# Patient Record
(demographics unavailable — no encounter records)

---

## 2024-11-13 NOTE — HISTORY OF PRESENT ILLNESS
[TextBox_4] : EX SMOKER CHEST CT 9/22 NOTED 10 MM GGO ( WAS 6 MM 22021) SP REPEAT CHEST CT INCREASING IN SIZE SP PFT SISTER LUNG CA DOING WELL

## 2024-11-13 NOTE — DISCUSSION/SUMMARY
[FreeTextEntry1] :  LUNG NODULE/ INCREASE IN SIZE ADENO IN SITU, REPEAT CHEST CT NOTED PET SCAN REFER FOR CT SX COPD MILD UACS WEIGHT LOSS FUP AFTER CHEST CT

## 2024-11-14 NOTE — BEGINNING OF VISIT
[0] : 2) Feeling down, depressed, or hopeless: Not at all (0) [PHQ-2 Negative] : PHQ-2 Negative [PHQ-9 Negative] : PHQ-9 Negative [Former] : Former [> 15 Years] : > 15 Years [Date Discussed (MM/DD/YY): ___] : Discussed: [unfilled] [With Patient/Caregiver] : with Patient/Caregiver

## 2024-11-18 NOTE — ASSESSMENT
[FreeTextEntry1] : #History of left breast cancer with no evidence of disease .                                                                                                 #low B12 and is on replacement.    #Osteoporosis on vitamin D, Calcium  Bone density 5/15/24 results show osteopenia with T-score 2.2  #COPD . pulmonary nodules , followed by pulmonary Last CT chest 10/29/24 shows 1.8cm right lower lobe subsolid/groundglass nodule, increased in size since 1/3/24 and 8/14/23. Additionally, b/l 4mm and smaller lung nodules, unchanged size, some resolved since 1/3/24.   Plan : repeat right mammogram, last mammo in November, 2023            PET scan scheduled for tomorrow           F/u with Dr. Hernandez           continue taking Vit D and calcium            follow up in one year

## 2024-11-18 NOTE — HISTORY OF PRESENT ILLNESS
[de-identified] : \par  HISTORY OF PRESENT ILLNESS:  The patient returns for remote history of invasive breast cancer diagnosed in 2008 status post left mastectomy, bilateral implant reconstruction, adjuvant hormonal therapy with Arimidex.  Most recent DEXA scan showed worsening of bone mineral density with maximum T-score of -2.6 representing 8% reduction compared to 2013.  Dr. Dwyer recommended not to resume Fosamax, which she took for two years in the past, possibly while on Arimidex.  She is scheduled to see Rheumatology for further evaluation.  She had negative colonoscopy recently.  \par  \par   [de-identified] : The patient returns for routine followup for remote history of breast cancer, she is back on fosamax for osteoporosis. She is caring for her young grandchildren after the passing of her daughter in law to lung cancer. she denies any significant arthralgias, bone pain, cough shortness of breath or headaches.   03/25/2019 : patient returns for history of left breast cancer , She had negative right mammogram in August . She was intolerant to fosomax and is only on vitamin D ( takes calcium once or twice weekly ) . Repeat CT chest showed stable pulmonary nodules and denies any cough or change in breathing .   8/27/2020: patient is here for follow visit for hx of left breast cancer s/p implant reconstruction  09/28/2021 Patient returns for routine follow up visit . she related no new complaints . she follows with pulmonary . she is on vitamin D 10.000 weekly .   9/27/2022 Patient returns for remote history of left breast cancer with no evidence of disease. She feels well , denies any cough , SOB , new breast lumps , bone pain or headaches .   9/26/2023 Patient returns for remote history of left breast cancer . She feels well and follows with pulmonary for groundglass opacities. no cough or change in breathing .   11/14/24: Patient returns today for follow-up of remote hx of left breast cancer. Pt admits to overall feeling well. Pt states she is due for her mammogram this year and is scheduled for a PET scan tomorrow. Last CT chest 10/29/24 shows 1.8cm right lower lobe subsolid/groundglass nodule, increased in size since 1/3/24 and 8/14/23. Additionally b/l 4mm and smaller lung nodules, unchanged size, some resolved since 1/3/24. Pt denies cough, SOB, or difficulty breathing,

## 2024-11-18 NOTE — HISTORY OF PRESENT ILLNESS
[de-identified] : \par  HISTORY OF PRESENT ILLNESS:  The patient returns for remote history of invasive breast cancer diagnosed in 2008 status post left mastectomy, bilateral implant reconstruction, adjuvant hormonal therapy with Arimidex.  Most recent DEXA scan showed worsening of bone mineral density with maximum T-score of -2.6 representing 8% reduction compared to 2013.  Dr. Dwyer recommended not to resume Fosamax, which she took for two years in the past, possibly while on Arimidex.  She is scheduled to see Rheumatology for further evaluation.  She had negative colonoscopy recently.  \par  \par   [de-identified] : The patient returns for routine followup for remote history of breast cancer, she is back on fosamax for osteoporosis. She is caring for her young grandchildren after the passing of her daughter in law to lung cancer. she denies any significant arthralgias, bone pain, cough shortness of breath or headaches.   03/25/2019 : patient returns for history of left breast cancer , She had negative right mammogram in August . She was intolerant to fosomax and is only on vitamin D ( takes calcium once or twice weekly ) . Repeat CT chest showed stable pulmonary nodules and denies any cough or change in breathing .   8/27/2020: patient is here for follow visit for hx of left breast cancer s/p implant reconstruction  09/28/2021 Patient returns for routine follow up visit . she related no new complaints . she follows with pulmonary . she is on vitamin D 10.000 weekly .   9/27/2022 Patient returns for remote history of left breast cancer with no evidence of disease. She feels well , denies any cough , SOB , new breast lumps , bone pain or headaches .   9/26/2023 Patient returns for remote history of left breast cancer . She feels well and follows with pulmonary for groundglass opacities. no cough or change in breathing .   11/14/24: Patient returns today for follow-up of remote hx of left breast cancer. Pt admits to overall feeling well. Pt states she is due for her mammogram this year and is scheduled for a PET scan tomorrow. Last CT chest 10/29/24 shows 1.8cm right lower lobe subsolid/groundglass nodule, increased in size since 1/3/24 and 8/14/23. Additionally b/l 4mm and smaller lung nodules, unchanged size, some resolved since 1/3/24. Pt denies cough, SOB, or difficulty breathing,

## 2024-11-18 NOTE — HISTORY OF PRESENT ILLNESS
[de-identified] : \par  HISTORY OF PRESENT ILLNESS:  The patient returns for remote history of invasive breast cancer diagnosed in 2008 status post left mastectomy, bilateral implant reconstruction, adjuvant hormonal therapy with Arimidex.  Most recent DEXA scan showed worsening of bone mineral density with maximum T-score of -2.6 representing 8% reduction compared to 2013.  Dr. Dwyer recommended not to resume Fosamax, which she took for two years in the past, possibly while on Arimidex.  She is scheduled to see Rheumatology for further evaluation.  She had negative colonoscopy recently.  \par  \par   [de-identified] : The patient returns for routine followup for remote history of breast cancer, she is back on fosamax for osteoporosis. She is caring for her young grandchildren after the passing of her daughter in law to lung cancer. she denies any significant arthralgias, bone pain, cough shortness of breath or headaches.   03/25/2019 : patient returns for history of left breast cancer , She had negative right mammogram in August . She was intolerant to fosomax and is only on vitamin D ( takes calcium once or twice weekly ) . Repeat CT chest showed stable pulmonary nodules and denies any cough or change in breathing .   8/27/2020: patient is here for follow visit for hx of left breast cancer s/p implant reconstruction  09/28/2021 Patient returns for routine follow up visit . she related no new complaints . she follows with pulmonary . she is on vitamin D 10.000 weekly .   9/27/2022 Patient returns for remote history of left breast cancer with no evidence of disease. She feels well , denies any cough , SOB , new breast lumps , bone pain or headaches .   9/26/2023 Patient returns for remote history of left breast cancer . She feels well and follows with pulmonary for groundglass opacities. no cough or change in breathing .   11/14/24: Patient returns today for follow-up of remote hx of left breast cancer. Pt admits to overall feeling well. Pt states she is due for her mammogram this year and is scheduled for a PET scan tomorrow. Last CT chest 10/29/24 shows 1.8cm right lower lobe subsolid/groundglass nodule, increased in size since 1/3/24 and 8/14/23. Additionally b/l 4mm and smaller lung nodules, unchanged size, some resolved since 1/3/24. Pt denies cough, SOB, or difficulty breathing,

## 2024-11-19 NOTE — HISTORY OF PRESENT ILLNESS
[FreeTextEntry1] :  Ms. JODY QUINONEZ is a 79 year F, former smoker, quit 35 years ago, that arrives today for a consultation for a 1.8 cm RLL GGO nodule, with interval growth since 1/3/2024 and 8/14/2023. Patient was seen by their Pulmonologist for routine follow up  CT Scan done on 10/29, followed by a PET/CT (Both @ ). PMHX: COPD, osteoporosis, Hiatal hernia, Left Breast CA , s/p left mastectomy with B/L implant [place] in 2008 ([place]) , (adjuvant chemo /hormonal therapy with Arimidex) x 5 years. Here today for surgical discussion.    Their Healthcare team is as follows: PMD: Dr. Levin Cardiologist: None Pulmonologist: Dr. Alisia Santos: Dr. Morin GYN: Yuliya    ECOG 0 , Independent, Lives with son and grandchildren Former smoker- 30 pack years COVID History- Vaccinated Denies major psychiatric history  Family History of Lung CA- Sister NSC

## 2024-11-19 NOTE — HISTORY OF PRESENT ILLNESS
[FreeTextEntry1] :  Ms. JODY QUINONEZ is a 79 year F, former smoker, quit 35 years ago, that arrives today for a consultation for a 1.8 cm RLL GGO nodule, with interval growth since 1/3/2024 and 8/14/2023. Patient was seen by their Pulmonologist for routine follow up  CT Scan done on 10/29, followed by a PET/CT (Both @ ). PMHX: COPD, osteoporosis, Hiatal hernia, Left Breast CA , s/p left mastectomy with B/L implant reconstruction in 2008 ([place]) , (adjuvant chemo /hormonal therapy with Arimidex) x 5 years. Here today for surgical discussion.    Their Healthcare team is as follows: PMD: Dr. Levin Cardiologist: None Pulmonologist: Dr. Alisia Santos: Dr. Morin GYN: Yuilya    ECOG 0 , Independent, Lives with son and grandchildren Former smoker- 30 pack years COVID History- Vaccinated Denies major psychiatric history  Family History of Lung CA- Sister NSC Consent: Written consent obtained. Risks include but not limited to lid/brow ptosis, bruising, swelling, diplopia, temporary effect, incomplete chemical denervation.

## 2024-11-19 NOTE — DATA REVIEWED
[FreeTextEntry1] : Full Report IMPRESSION:   1.  The enlarging groundglass nodule in the right lower lobe is mildly FDG avid. Continue close CT chest surveillance..   2.  Status post left mastectomy and implant reconstruction, symmetrizing right breast implant. Intracapsular rupture of the left implant..   3.  There is no PET/CT evidence of locoregional recurrence..     Diagnostic confidence level used in this report:   Consistent with/compatible with or no modifier - greater than 98% Most likely - greater than 90% Likely/probably - greater than 75% Possibly 50% Less likely - less than 25% Unlikely - less than 5%     AGENT:   13.8 mCi F18-FDG, IV via the right antecubital vein.   HISTORY:   79-year-old female with history of left breast carcinoma, status post mastectomy. In 2008. Enlarging lung nodules .   EXAM CATEGORY:   Initial treatment strategy.   TECHNIQUE:   90 minutes following injection of the radiopharmaceutical, PET/CT imaging was performed from the skull base to the mid thigh.  No oral and IV contrast was administered. Fasting serum glucose was 82 mg/dL prior to injection.     All SUV values reported represent maximum SUV (SUVmax) unless otherwise specified.  This study was interpreted using a lean body mass corrected SUV technique.  Please note this may result in lower SUV values compared to a body-weight corrected technique. .   CT protocol was designed for attenuation correction of PET and used for anatomic localization of PET findings. Not optimized for anatomical resolution and contrast. This low-dose  CT cannot replace state-of-the-art diagnostic CT scans with dedicated protocols for different body parts and indications.   Pulmonary nodule slice numbers refer to the lung kernel reconstruction unless specified.     COMPARISON:   CT chest 10/29/2024   FINDINGS:   Head and neck:   There is no suspicious FDG uptake in the head and neck.   No head and neck lymphadenopathy.   Chest:   The enlarging groundglass nodule in the right lower lobe is mildly FDG avid, series 3 image 69 SUV 0.5. Continue CT chest surveillance in 6 month.   The other subcentimeter solid nodules in the lungs are non-FDG avid, some are less conspicuous on CT due to respiratory blur.   Status post left mastectomy and implant reconstruction, symmetrizing right breast implant. There is intracapsular rupture of the left implant (screening mediastinum. There is capsular calcification of the right breast implant.   There is no hilar, mediastinal, supraclavicular, or axillary lymphadenopathy. No pleural or pericardial effusions. The heart size is normal. There is moderate calcification of the coronary arteries and of the thoracic aorta. There is a small hiatal hernia..   Abdomen and pelvis:   There is no suspicious FDG uptake in the abdomen/pelvis.   On CT, the solid organs of the abdomen and pelvis are unremarkable.  No lymphadenopathy.  No acute bowel-related abnormality. There is colonic diverticulosis.   Musculoskeletal and extremities:   There are no suspicious FDG-avid osseous lesions. There is FDG activity of the periarticular soft tissues (shoulders and hips) reflecting inflammation secondary to degenerative changes.   No aggressive osseous lesions are seen on CT.   Electronic Signature: I personally reviewed the images and agree with this report. Final Report: Dictated by  and Signed by Attending Evy Hernandez MD 11/18/2024 3:01 PM External Result Report  CT Chest Full Report IMPRESSION:      IMPORTANT FINDING. THIS REPORT WILL BE FLAGGED (!) IN EPIC.   1.8 CM RIGHT LOWER LOBE SUBSOLID OR GROUNDGLASS NODULE, INCREASED SIZE SINCE 1/3/2024 AND 8/14/2023, POSSIBLY ALONG SPECTRUM OF ADENOCARCINOMA. FURTHER EVALUATION WITH PET CT AND/OR CLOSE INTERVAL FOLLOW-UP CT CHEST IN 3-6 MONTHS RECOMMENDED.   Bilateral 4 mm and smaller lung nodules, unchanged size, some resolved since 1/3/2024.         CT CHEST WITHOUT IV CONTRAST   CLINICAL INDICATION: Lung nodules. Recurring cough   COMPARISON:   CT chest studies, most recent 1/3/2024   TECHNIQUE: Noncontrast chest CT was performed.  Multiplanar, (axial, sagittal and coronal)  CT and HRCT images were reviewed.   FINDINGS:    LUNGS, AIRWAYS: Trachea and central bronchi patent. No bronchiectasis. Mild diffuse bronchial wall thickening.   No emphysema, interstitial lung disease or pulmonary fibrosis. Mild right upper lobe parenchymal scarring. No focal consolidation.   1.8 cm right lower lobe posterior subsolid groundglass nodule, increased size. Series 4 image 189. Bilateral 5 mm and smaller solid nodules, unchanged size, some resolved. Annotated on series 4.   PLEURA: No pleural effusion   MEDIASTINUM, TRACIE, LYMPH NODES: No enlarged axillary or thoracic lymph nodes   HEART AND VESSELS: Heart normal size, no pericardial effusion. Mild coronary artery calcifications. Normal caliber aorta with mild calcifications. Main pulmonary artery is normal size.   UPPER ABDOMEN: No adrenal nodule   BONES AND SOFT TISSUES: Bilateral breast prostheses, left greater than right with possible rupture, unchanged. Mild degenerative changes of the spine with mild osteopenia, mild dextroscoliosis, mild to moderate kyphosis. No aggressive osseous lesion.   LOWER NECK: Small, partially imaged unremarkable thyroid gland.   Electronic Signature: I personally reviewed the images and agree with this report. Final Report: Dictated by  and Signed by Attending Dorian Gutierrez MD 10/29/2024 1:53 PM External Result Report

## 2024-11-19 NOTE — PHYSICAL EXAM
[General Appearance - Alert] : alert [General Appearance - In No Acute Distress] : in no acute distress [Sclera] : the sclera and conjunctiva were normal [PERRL With Normal Accommodation] : pupils were equal in size, round, and reactive to light [Extraocular Movements] : extraocular movements were intact [Outer Ear] : the ears and nose were normal in appearance [Oropharynx] : the oropharynx was normal [Neck Appearance] : the appearance of the neck was normal [Neck Cervical Mass (___cm)] : no neck mass was observed [Jugular Venous Distention Increased] : there was no jugular-venous distention [Thyroid Diffuse Enlargement] : the thyroid was not enlarged [Thyroid Nodule] : there were no palpable thyroid nodules [Auscultation Breath Sounds / Voice Sounds] : lungs were clear to auscultation bilaterally [Heart Rate And Rhythm] : heart rate was normal and rhythm regular [Heart Sounds] : normal S1 and S2 [Heart Sounds Gallop] : no gallops [Murmurs] : no murmurs [Heart Sounds Pericardial Friction Rub] : no pericardial rub [Examination Of The Chest] : the chest was normal in appearance [Chest Visual Inspection Thoracic Asymmetry] : no chest asymmetry [Diminished Respiratory Excursion] : normal chest expansion [Abnormal Walk] : normal gait [Nail Clubbing] : no clubbing  or cyanosis of the fingernails [Musculoskeletal - Swelling] : no joint swelling seen [Motor Tone] : muscle strength and tone were normal [Skin Color & Pigmentation] : normal skin color and pigmentation [Skin Turgor] : normal skin turgor [Deep Tendon Reflexes (DTR)] : deep tendon reflexes were 2+ and symmetric [Sensation] : the sensory exam was normal to light touch and pinprick [No Focal Deficits] : no focal deficits [Oriented To Time, Place, And Person] : oriented to person, place, and time [Impaired Insight] : insight and judgment were intact [Affect] : the affect was normal [Bowel Sounds] : normal bowel sounds [Abdomen Soft] : soft [Abdomen Tenderness] : non-tender [] : no hepato-splenomegaly [Abdomen Mass (___ Cm)] : no abdominal mass palpated [No CVA Tenderness] : no ~M costovertebral angle tenderness [No Spinal Tenderness] : no spinal tenderness

## 2024-11-19 NOTE — ASSESSMENT
[FreeTextEntry1] : CT Chest & PET /CT images reviewed and discussed with patient, revealing, enlarging groundglass nodule in the right lower lobe is mildly FDG avid and other subcentimeter solid nodules in the lungs (Non-FDG avid) No adenopathy noted. Of note, Left intracapsular rupture of implant.  Alternatives discussed including surveillance vs biopsy vs Resection. All associated risks and benefits reviewed as well. Patient is currently uncertain if she wishes to survey or proceed with surgery at this time.  -Plan Requested patient to bring in disc from [facility] to upload F/U Breast Surgeon referral to Dr. Lc watt at office visit F/U [facility]  upon patient decision If Surveillance decided, will need CT Chest in 4 months If Resection decided will need PFTs / Cardiac clearance (referral for Cardiologist needed as well)

## 2025-01-10 NOTE — DATA REVIEWED
[FreeTextEntry1] : Last mammogram, right breast 8/29/18)-no mammographic evidence of malignancy BIRADS 1\par  \par  CT chest /abd/pelvis 9/14/18)-no evidence of metastatic disease no inflammatory process. bilateral breast prostheses present.  Left mastectomy. Prosthesis asymmetric in size with partial collapse of the right noted.  No axillary adenopathy.\par  \par

## 2025-01-10 NOTE — ASSESSMENT
[FreeTextEntry1] : 78 yo F with h/o of left breast cancer s/p silicone implant reconstruction and right breast silicone implant augmentation for symmetry.    Right breast with asymptomatic capsular contracture and double bubble deformity.   Left breast with asymptomatic intracapsular implant rupture   -Recommend patient proceed with right lung nodule r/o malignancy with Dr. Mitchell before considering elective non-urgent breast reconstruction revision. -Briefly discussed the option of right breast revision for symmetry-capsulectomy, implant exchange, and mastopexy and concurrent left breast removal of ruptured implant and replacement with silicone implant -All questions were answered -Daughter present for encounter -Follow up in 3 months after CT Surgery and recovery

## 2025-01-10 NOTE — REASON FOR VISIT
[Follow-Up: _____] : a [unfilled] follow-up visit [Friend] : friend [FreeTextEntry1] : DR Morin-[facility] [place]; Dr. Dwyer-Gyn

## 2025-01-10 NOTE — PHYSICAL EXAM
[Bra Size: _______] : Bra Size: [unfilled] [de-identified] : well-appearing, NAD [de-identified] :  Right breast: no palpable breast masses, well-healed periareolar scar, nipple discharge or retraction, breast ptosis grade 2 with double bubble deformity; nontender visible and palpable capsular contracture grade 3/4, implant animation mild with pectoralis contraction, nontender chest wall Left breast: no palpable breast masses, well-healed transverse mastectomy scar with mobile submuscular implant with moderate animation deformity, grade 1 capsular contracture, nontender chest wall. Bilateral breast with no palpable breast masses Bilateral ptotic axillary roll, left > right Bilateral axilla without palpable lymphadenopathy

## 2025-01-10 NOTE — HISTORY OF PRESENT ILLNESS
[FreeTextEntry1] : 74 yo F with PMHx of GERD on PPI s/p with history of <1 cm left breast cancer 10/2008, s/p mastectomy 2008 ([facility]-Breast surgeon), and delayed TE of the left breast 2009, followed by left TE to silicone implant exchange and concurrent right breast augmentation for symmetry ([facility]).  No history of chemo or XRT therapy.  Currently in a 38B bra with pads  Referred by Dr. Morin for CT scan report of "Prosthesis asymmetric in size with partial collapse of the right noted."  CT scans ordered for work up of chest pain and h/o GERD.  The patient did not bring CD of CT scans for review. Reports have breast surgery related pain in both breast since date of mastectomy has continued after reconstruction.  Endorses that the pain is both constant and tolerable; no increase or change in quality of pain.  She is not interested in revision surgery as she is the primary caretaker of her young grandkids; she is only concerned if their is an implant leak.  She denies chest trauma, breast redness, nipple discharge, breast nodules, breast swelling. Pt provided silicone implant card; Left breast 666 cc and right breast 120 cc.  Last mammogram, right breast 18)-negative  She is raising her grandson (3 yr) and granddaughter (7 yrs) because daughter in law ([place] lung cancer,  of metastatic disease).  She is the primary caretaker of her grandkids.  Accompanied by her friend.  Interval hx (1/10/25):  Last seen in [facility] office 18 who is now here for evaluation of ruptured left implant as seen on PET scan.  Patient states she has a newly diagnosed RLL nodule which she is undergoing biopsy for at the end of January by Dr. Ernst Baker.  During further work up for nodule she underwent a PET scan which revealed intracapsular rupture of left implant.  Patient denies any chest wall trauma or pain.  She does report some discomfort to the right breast and feels like the implant is "high" but does not know how long it has been that way.     Left implant: ALLERGAN Natrelle silicone filled breast implant -666; SN [id number], 666 cc; Right implant: ALLERGAN Natrelle silicone filled breast implant, -180, SN [id number], 180 cc

## 2025-01-23 NOTE — HISTORY OF PRESENT ILLNESS
[FreeTextEntry1] : Ms. Ag is a 79-year-old woman, former smoker (quit >30 years ago, about 30PY), with history of breast cancer (2008, s/p mastectomy, no chemotherapy or RT), and lung nodule presenting for pre-operative cardiac consultation.  She is being followed by [facility] for a PET positive lung nodule and is planned for surgery in the near future. She is physically active and helps take care of her grandchildren. Currently has significant knee pain which limits her functional capacity, but notes just a few months prior was climbing stairs without issue. She denies chest pain, dyspnea, palpitations, pre-syncope, syncope, LE swelling, PND, or orthopnea.  ECG shows NSR, normal axis, normal intervals, no ischemic changes.  CT Chest 2024: 1.8cm RLL nodule increased in size, with mild CAC  Labs: - No recent cardiometabolic labs on file.  Meds: - Ca - Vit D - Esomeprazole

## 2025-01-23 NOTE — ASSESSMENT
[FreeTextEntry1] : Ms. Ag is a 79-year-old woman, former smoker (quit >30 years ago, about 30PY), with history of breast cancer (2008, s/p mastectomy, no chemotherapy or RT), and lung nodule presenting for pre-operative cardiac consultation.  Impression: (1) Lung nodule (2) Breast CA, s/p mastectomy, no chemo or RT (3) Mild COPD, former heavy smoker, quit >30 years ago, most recent PFTs read as normal (4) Mild CAC seen on CT chest  Plan: - Patient denies active or recent anginal complaints. She states that within the past few months she could climb >2 flights of stairs without effort limiting dyspnea or chest pain. Baseline ECG shows no significant abnormalities. Will check a baseline TTE prior to surgery. If normal, she may proceed without further cardiac testing or intervention. - Recommended initiation of statin therapy based on mild CAC seen on CT chest. Patient to consider. Will review her labs from her PCP prior to her follow up. - After surgery, will refer for vascular screening due to significant smoking history: carotid doppler, AAA US, and TEO/PVR.  RTC 1-2 months

## 2025-02-14 NOTE — ASSESSMENT
[FreeTextEntry1] : 78 yo Female s/p RUL wedge and RLL superior segmentectomy. RLL T1b N0 invasive acinar adenocarcinoma Here for review of PATH and post CXR. PMH of Left breast cancer (2008) s/p Mastectomy and Chemo Po x 5 yrs, GERD, Lung nodule who due to progressively increasing size on Right LLL nodule is now s/p right VATS, RUL wedge resection, RLL superior segmentectomy, and MLND. RLL wedge was sent for frozen, coming back positive. Right chest tube place intraop. On 1/30; CT was placed on waterseal and then was removed; post- pull CXR showed no pneumothorax. Patient is stable hemodynamically, tolerating diet, pain is controlled, and is ambulating the unit. Stable for discharge. Here for post op visit with CXR.    Plan: #s/p lung - Reviewed Chest Xray - TTM 2 weeks - Percocet Rx Refill - Nystatin for rash  - CT chest non con 6 months and RTO I, Sarath Baker saw, examined and reviewed the diagnostic images on patient:  JODY QUINONEZ on 02/11/2025 and agreed with my Nurse Practitioner's clinical note, physical exam findings and treatment plan. Patient presented for postoperative follow-up.  She is status post robotic assisted right lower lobe superior segmentectomy and mediastinal lymph node dissection.  Procedure date 1/29/2025.  Procedure without complications.  Pathology report pT1b N0 adenocarcinoma.  Patient is recovering well, surgical wounds are healing fine.  Chest x-ray within normal limits.  No additional intervention considered indicated.  Activity recommendations given.  Plan for chest CT in 6 months for surveillance.

## 2025-02-14 NOTE — REASON FOR VISIT
[de-identified] : RLL superior segment and RUL wedge resection  [de-identified] : RLL segment T1b N0 invasive acinar adenocarcinoma

## 2025-02-14 NOTE — REASON FOR VISIT
[de-identified] : RLL superior segment and RUL wedge resection  [de-identified] : RLL segment T1b N0 invasive acinar adenocarcinoma

## 2025-02-14 NOTE — COUNSELING
[FreeTextEntry1] : Ms. JODY QUINONEZ 79 year F   , S/P Lung Resection.  Incisions healing well and all sutures were removed. Incision site care was reviewed. No lotions, perfumes to the incision site. On arrival patient denies fever, chills nausea, and vomiting. Denies SOB or palpitation. All questions and concerns were addressed.  Medications were reviewed with the patient. Pain needs addressed.  Advised no flying or lifting anything >10lbs for at least 6 weeks post thoracic surgery.

## 2025-02-14 NOTE — REASON FOR VISIT
[de-identified] : RLL superior segment and RUL wedge resection  [de-identified] : RLL segment T1b N0 invasive acinar adenocarcinoma

## 2025-02-25 NOTE — DISCUSSION/SUMMARY
[Doing Well] : is doing well [Fair Pain Control] : has fair pain control [No Sign of Infection] : is showing no signs of infection [2] : 2

## 2025-02-27 NOTE — ASSESSMENT
[FreeTextEntry1] : Ms Jody Ag is a79 yo Female s/p RUL wedge and RLL superior segmentectomy. 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 PMH of Left breast cancer (2008) s/p Mastectomy and Chemo Po x 5 yrs, GERD, Lung nodule who due to progressively increasing size on Right LLL nodule is now s/p right VATS, RUL wedge resection, RLL superior segmentectomy, and MLND. RLL wedge was sent for frozen, coming back positive. Right chest tube place intraop. On 1/30; CT was placed on waterseal and then was removed; post- pull CXR showed no pneumothorax. Patient is stable hemodynamically, tolerating diet, pain is controlled, and is ambulating the unit. Stable for discharge. Patient states she is having some SOB and is here for follow up of repeat CXR.  Plan: - CXR reviewed - Rash improved - Pain persistent - gabapentin 100mg BID - Cough persistent- benzonatate PRN - 4 week TTM for symptoms - 6 month CT scan and follow-up ISarath saw, examined and reviewed the diagnostic images on patient:  JODY AG on 02/25/2025 and agreed with my Nurse Practitioner's clinical note, physical exam findings and treatment plan.

## 2025-02-27 NOTE — REASON FOR VISIT
[de-identified] : RLL superior segment and RUL wedge resection (pTibN0 invasive acinar adenocarcinoma) [de-identified] : 1/29/25 [de-identified] : Repeat CXR

## 2025-02-27 NOTE — COUNSELING
[Hygeine (Including Daily Shower)] : hygeine (including daily shower) [Importance of Regular Medical Follow-Up] : the importance of regular medical follow-up [No Heavy Lifting] : no heavy lifting (>15-20 lb. for 1 month or 25 lb. for 3 months from date of surgery) [Blood Pressure Control] : blood pressure control [S/S of infection] : signs and symptoms of infection (and to whom it should be reported) [Progressive Ambulation/Activity] : progressive ambulation/activity [Medication/Vitamin/Herb/Food Interaction] : medication/vitamin/herb/food interaction [FreeTextEntry1] : Incisions healing well and all sutures were removed. Incision site care was reviewed. No lotions, perfumes to the incision site. On arrival patient denies fever, chills nausea, and vomiting. Denies SOB or palpitation. All questions and concerns were addressed.  Medications were reviewed with the patient. Pain needs addressed.  Advised no flying or lifting anything >10lbs for at least 6 weeks post thoracic surgery.

## 2025-02-27 NOTE — REASON FOR VISIT
[de-identified] : RLL superior segment and RUL wedge resection (pTibN0 invasive acinar adenocarcinoma) [de-identified] : 1/29/25 [de-identified] : Repeat CXR

## 2025-03-25 NOTE — REASON FOR VISIT
[Home] : at home, [unfilled] , at the time of the visit. [Medical Office: ([facility])___] : at the medical office located in  [Telephone (audio)] : This telephonic visit was provided via audio only technology. [No access to tele-video equipment] : patient lacks access to tele-video equipment. [Verbal consent obtained from patient] : the patient, [unfilled]

## 2025-03-25 NOTE — REASON FOR VISIT
[Home] : at home, [unfilled] , at the time of the visit. [Medical Office: ([facility])___] : at the medical office located in  [Telephone (audio)] : This telephonic visit was provided via audio only technology. [No access to tele-video equipment] : patient lacks access to tele-video equipment. [Verbal consent obtained from patient] : the patient, [unfilled]

## 2025-03-25 NOTE — REASON FOR VISIT
[Home] : at home, [unfilled] , at the time of the visit. [Medical Office: ([facility])___] : at the medical office located in  [Telephone (audio)] : This telephonic visit was provided via audio only technology. [No access to tele-video equipment] : patient lacks access to tele-video equipment. [Verbal consent obtained from patient] : the patient, [unfilled]

## 2025-03-31 NOTE — HISTORY OF PRESENT ILLNESS
[FreeTextEntry1] : S/P RLL superior segment and RUL wedge resection (pTibN0 invasive acinar adenocarcinoma) on  1/29/25. Called today for follow up for cough.   Ms Rita Ag is a79 yo Female s/p RUL wedge and RLL superior segmentectomy. 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 PMH of Left breast cancer (2008) s/p Mastectomy and Chemo Po x 5 yrs, GERD, Lung nodule who due to progressively increasing size on Right LLL nodule is now s/p right VATS, RUL wedge resection, RLL superior segmentectomy, and MLND

## 2025-03-31 NOTE — ASSESSMENT
[FreeTextEntry1] : S/P RLL superior segment and RUL wedge resection (pTibN0 invasive acinar adenocarcinoma) on  1/29/25. Called today for follow up for cough.   c/o of pain advised to take Percocet for pain Continue Benzonatate for cough   F/U CTS 6 months post resection with CT chest.  Will call if symptoms do not improve  Spent 12 minutes  ISarath spoke with and reviewed the diagnostic images on patient:  JODY QUINONEZ on 03/25/2025 and agreed with my Nurse Practitioner's clinical note and treatment plan.

## 2025-04-01 NOTE — DISCUSSION/SUMMARY
[FreeTextEntry1] : LUNG NODULE/ INCREASE IN SIZE SP SX / RLL superior segment and RUL wedge resection (pTibN0 invasive acinar adenocarcinoma) on 1/29/25. Called today for follow up for cough. COPD exacerbation UACS WEIGHT LOSS

## 2025-04-01 NOTE — HISTORY OF PRESENT ILLNESS
[TextBox_4] : S/P RLL superior segment and RUL wedge resection (pTibN0 invasive acinar adenocarcinoma) on 1/29/25. CO cough, SOB no inhalers

## 2025-04-07 NOTE — HISTORY OF PRESENT ILLNESS
[FreeTextEntry1] : 72 yo F with PMHx of GERD on PPI s/p with history of <1 cm left breast cancer 10/2008, s/p mastectomy 2008 (Carol King-Breast surgeon), and delayed TE of the left breast 2009, followed by left TE to silicone implant exchange and concurrent right breast augmentation for symmetry (Maki Inman [facility]).  No history of chemo or XRT therapy.  Currently in a 38B bra with pads  Referred by Dr. Morin for CT scan report of "Prosthesis asymmetric in size with partial collapse of the right noted."  CT scans ordered for work up of chest pain and h/o GERD.  The patient did not bring CD of CT scans for review. Reports have breast surgery related pain in both breast since date of mastectomy has continued after reconstruction.  Endorses that the pain is both constant and tolerable; no increase or change in quality of pain.  She is not interested in revision surgery as she is the primary caretaker of her young grandkids; she is only concerned if their is an implant leak.  She denies chest trauma, breast redness, nipple discharge, breast nodules, breast swelling. Pt provided silicone implant card; Left breast 666 cc and right breast 120 cc.  Last mammogram, right breast 18)-negative  She is raising her grandson (3 yr) and granddaughter (7 yrs) because daughter in law ([place] lung cancer,  of metastatic disease).  She is the primary caretaker of her grandkids.  Accompanied by her friend.  Interval hx (1/10/25):  Last seen in PRS office 18 who is now here for evaluation of ruptured left implant as seen on PET scan.  Patient states she has a newly diagnosed RLL nodule which she is undergoing biopsy for at the end of January by Dr. Ernst Baker.  During further work up for nodule she underwent a PET scan which revealed intracapsular rupture of left implant.  Patient denies any chest wall trauma or pain.  She does report some discomfort to the right breast and feels like the implant is "high" but does not know how long it has been that way.     Left implant: ALLERGAN Natrelle silicone filled breast implant -666; SN [id number], 666 cc; Right implant: ALLERGAN Natrelle silicone filled breast implant, -180, SN [id number], 180 cc   Interval hx (25): Here after having CT sx with DR. Mitchell.   s/p right VATS, RUL wedge resection, RLL superior segmentectomy, and MLND (25) for invasive acinar adenocarcinoma).  c/o of band like pain around chest, on gabapentin for neuropathy.  On steroid and symbicort per Pulm for persistent cough. Here for implant check.  Denies bilateral breast pain.    MMG 2024 BIRADS 1.

## 2025-04-07 NOTE — REASON FOR VISIT
[Follow-Up: _____] : a [unfilled] follow-up visit [Friend] : friend [FreeTextEntry1] : DR Morin-[facility]; Dr. Dwyer-Gyn

## 2025-04-07 NOTE — PHYSICAL EXAM
[Bra Size: _______] : Bra Size: [unfilled] [de-identified] : well-appearing, NAD [de-identified] : Unchanged exam (4/7/25) Right breast: no palpable breast masses, well-healed periareolar scar, nipple discharge or retraction, breast ptosis grade 2 with double bubble deformity; nontender visible and palpable capsular contracture grade 3/4, implant animation mild with pectoralis contraction, nontender chest wall Left breast: no palpable breast masses, well-healed transverse mastectomy scar with mobile submuscular implant with moderate animation deformity, grade 1 capsular contracture, nontender chest wall. Bilateral breast with no palpable breast masses Bilateral ptotic axillary roll, left > right Bilateral axilla without palpable lymphadenopathy

## 2025-04-07 NOTE — ASSESSMENT
[FreeTextEntry1] : 78 yo F with h/o of left breast cancer s/p silicone implant reconstruction and right breast silicone implant augmentation for symmetry.   s/p right VATS, RUL wedge resection, RLL superior segmentectomy, and MLND (1/29/25) for invasive acinar adenocarcinoma)  Right breast with asymptomatic capsular contracture and double bubble deformity.   Left breast with asymptomatic intracapsular implant rupture   -Recommend patient proceed with right lung nodule r/o malignancy with Dr. Mitchell before considering elective non-urgent breast reconstruction revision. -Briefly discussed the option of right breast revision for symmetry-capsulectomy, implant exchange, and mastopexy and concurrent left breast removal of ruptured implant and replacement with silicone implant -All questions were answered -Daughter present for encounter -Follow up in 3 months after further CT Surgery recovery and current persistent cough

## 2025-04-09 NOTE — ASSESSMENT
[FreeTextEntry1] : Ms. Ag is an 80-year-old woman, former smoker (quit >30 years ago, about 30PY), with history of breast cancer (2008, s/p mastectomy, no chemotherapy or RT), and lung CA (invasive acinar adenoCA) s/p resection 1/2025 presenting for follow up.  Impression: (1) Lung CA, s/p resection 1/2025 (2) Breast CA, s/p mastectomy, no chemo or RT (3) Mild COPD, former heavy smoker, quit >30 years ago, most recent PFTs read as normal (4) Mild CAC seen on CT chest  Plan: - Recommended initiation of statin therapy based on mild CAC seen on CT chest.  - Carotid doppler and TEO/PVR for screening in light of significant smoking history. - Elevated BP noted. Prior office readings have been normal/low. Advised home monitoring and to call if her average BP exceeds 130/80. - May proceed with endoscopy without further cardiac testing or intervention.  RTC 6 months

## 2025-04-09 NOTE — HISTORY OF PRESENT ILLNESS
[FreeTextEntry1] : Ms. Ag is an 80-year-old woman, former smoker (quit >30 years ago, about 30PY), with history of breast cancer (2008, s/p mastectomy, no chemotherapy or RT), and lung CA (invasive acinar adenoCA) s/p resection 1/2025 presenting for follow up.  She is being followed by CTSx for a PET positive lung nodule and is planned for surgery in the near future. She is physically active and helps take care of her grandchildren. Currently has significant knee pain which limits her functional capacity, but notes just a few months prior was climbing stairs without issue. She denies chest pain, dyspnea, palpitations, pre-syncope, syncope, LE swelling, PND, or orthopnea.  She underwent surgery 1/29/25 without untoward events. Today complains of cough, otherwise no acute issues.  ECG shows NSR, normal axis, normal intervals, no ischemic changes.  CT Chest 2024: 1.8cm RLL nodule increased in size, with mild CAC  Labs: - Cr 0.6, K 5.5 - LDL 95, , HDL 57

## 2025-05-30 NOTE — HISTORY OF PRESENT ILLNESS
[TextBox_4] : S/P RLL superior segment and RUL wedge resection (pTibN0 invasive acinar adenocarcinoma) on 1/29/25. CO cough, SOB STILL CO R SIDED CP

## 2025-05-30 NOTE — DISCUSSION/SUMMARY
[FreeTextEntry1] : LUNG NODULE/ INCREASE IN SIZE SP SX / RLL superior segment and RUL wedge resection (pTibN0 invasive acinar adenocarcinoma) on 1/29/25. Called today for follow up for cough. COPD STABLE UACS WEIGHT LOSS

## 2025-07-16 NOTE — HISTORY OF PRESENT ILLNESS
[FreeTextEntry1] : 74 yo F with PMHx of GERD on PPI s/p with history of <1 cm left breast cancer 10/2008, s/p mastectomy 2008 (Carol King-Breast surgeon), and delayed TE of the left breast 2009, followed by left TE to silicone implant exchange and concurrent right breast augmentation for symmetry (Maki Inman [facility]).  No history of chemo or XRT therapy.  Currently in a 38B bra with pads  Referred by Dr. Morin for CT scan report of "Prosthesis asymmetric in size with partial collapse of the right noted."  CT scans ordered for work up of chest pain and h/o GERD.  The patient did not bring CD of CT scans for review. Reports have breast surgery related pain in both breast since date of mastectomy has continued after reconstruction.  Endorses that the pain is both constant and tolerable; no increase or change in quality of pain.  She is not interested in revision surgery as she is the primary caretaker of her young grandkids; she is only concerned if their is an implant leak.  She denies chest trauma, breast redness, nipple discharge, breast nodules, breast swelling. Pt provided silicone implant card; Left breast 666 cc and right breast 120 cc.  Last mammogram, right breast 18)-negative  She is raising her grandson (3 yr) and granddaughter (7 yrs) because daughter in law ([place] lung cancer,  of metastatic disease).  She is the primary caretaker of her grandkids.  Accompanied by her friend.  Interval hx (1/10/25):  Last seen in PRS office 18 who is now here for evaluation of ruptured left implant as seen on PET scan.  Patient states she has a newly diagnosed RLL nodule which she is undergoing biopsy for at the end of January by Dr. Ernst Baker.  During further work up for nodule she underwent a PET scan which revealed intracapsular rupture of left implant.  Patient denies any chest wall trauma or pain.  She does report some discomfort to the right breast and feels like the implant is "high" but does not know how long it has been that way.     Left implant: ALLERGAN Natrelle silicone filled breast implant -666; SN [id number], 666 cc; Right implant: ALLERGAN Natrelle silicone filled breast implant, -180, SN [id number], 180 cc   Interval hx (25): Here after having CT sx with DR. Mitchell.   s/p right VATS, RUL wedge resection, RLL superior segmentectomy, and MLND (25) for invasive acinar adenocarcinoma).  c/o of band like pain around chest, on gabapentin for neuropathy.  On steroid and symbicort per Pulm for persistent cough. Here for implant check.  Denies bilateral breast pain.    MMG 2024 BIRADS 1.  Interval hx (25):  She has not yet seen Dr. Mitchell s/p right VATS, RUL wedge resection, RLL superior segmentectomy, and MLND (25) for invasive acinar adenocarcinoma).  c/o of band like pain around chest, remains gabapentin 300 mg BID for neuropathy.  On ? medicated patches with some relief.   Cough has since resolved.  Had endoscopy & colonoscopy 3 days ago with no concerning findings per pt.  Complaint of right upper breast fullness and hard implant.  Here to discuss next steps for breast reconstruction revision and ruptured implant removal.

## 2025-07-16 NOTE — REASON FOR VISIT
[Follow-Up: _____] : a [unfilled] follow-up visit [Friend] : friend [FreeTextEntry1] : DR Morin-[facility]; Dr. Dwyer-Gyn

## 2025-07-16 NOTE — PHYSICAL EXAM
[Bra Size: _______] : Bra Size: [unfilled] [de-identified] : well-appearing, NAD [de-identified] : Unchanged exam (4/7/25) Right breast: no palpable breast masses, well-healed periareolar scar, nipple discharge or retraction, breast ptosis grade 2 with double bubble deformity; nontender visible and palpable capsular contracture grade 4 and malpositioned into UOQ, implant animation mild with pectoralis contraction, nontender chest wall Left breast: no palpable breast masses, well-healed transverse mastectomy scar with mobile submuscular implant with moderate animation deformity, grade 1 capsular contracture, nontender chest wall. Bilateral breast with no palpable breast masses Bilateral ptotic axillary roll, left > right Bilateral axilla without palpable lymphadenopathy  Bilateral breasts symmetrical and Grade 2 ptosis, volume *** greater than *** breast Left breast: no palpable masses, nipple retraction or discharge. Right breast: no palpable masses, nipple retraction or discharge. Axilla: no clinically palpable lymph adenopathy bilateral Chest: no trunk deformities Palpable LD muscle with moderate minimal soft tissue bulk, bilateral  Breast measurements (standing; (cm)): Regnault ptosis grade: n/a R SN-Nipple: 24 R Nipple-IMF: 9 R Base width: 14 R Nipple - midsternum: 8 R NAC diameter: 6  IMF position asymmetrical, right 2 cm lower than *** breast  L SN-: 20 L Nipple-: 9.5 L Base width: 15 L Nipple - midsternum: L NAC diameter:  [de-identified] : softly protuberant

## 2025-07-16 NOTE — ASSESSMENT
[FreeTextEntry1] : 80 yo F with h/o of left breast cancer s/p submuscular silicone implant reconstruction and right breast silicone implant augmentation for symmetry.   s/p right VATS, RUL wedge resection, RLL superior segmentectomy, and MLND (1/29/25) for invasive acinar adenocarcinoma)  Right breast with symptomatic capsular contracture (garde 4) and double bubble deformity.   Left breast with symptomatic intracapsular implant rupture -   Recommend left breast reconstruction revision with totatl capsulectomy, removal of ruptured implantand  replacement with silicone implant (pt desire slight upsize), and right breast mastopexy removal of silicone implant, possible replacement of  silicone implant  Patient undecided about proceeding or foregoing right breast silicone implant re-insertion.  Pt to consider this option, and would like to otherwise proceed with all other recommendations.  -Regarding breast implants, we discussed saline vs silicone breast implants options (benefits, risks and outcomes).   -Regarding implant exchange, the benefits, risks, and outcomes of implant surgery discussed.  I discussed the incision location.  The risks included but are not limited to bleeding, infection, seroma, hematoma, paraesthesia, poor wound healing, implant failure (infection, extrusion, rupture), implant malposition, capsular contracture, asymmetry, small risk of breast-implant associated lymphoma, possible need for additional planned or unplanned surgery including revision and/or autologous tissue reconstruction (e.g. pedicled latissimus dorsi flap, TDAP flap, etc), and dissatisfaction with outcome.   -She was also informed on the off-label use of biologic mesh, its benefits, risks and alternatives. She was given the opportunity to ask questions; and all were answered to her satisfaction at this time. -Regarding capsular contracture, the patient understands the risk of recurrent capsular contracture. The risk of capsular contracture is increased with infection, radiation therapy, smoking, and history of prior implant capsular contracture. -We also discussed the likelihood of additional surgery on the breast over the course of her lifetime. -Reviewed that her neuropathic chest pain may or may no be resolved after breast reconstruction procedures.  She endorses understandin. -Post-operative course discussed including the need for surgical bra use and no heavy lifting -All questions were answered to the patient's apparent satisfaction, and informed consent was obtained -Follow up with CT Surgery, Cardiology, and Pulm for clearance -Will schedule for CORA procedure under [place]  Photos were taken with patient permission

## 2025-07-16 NOTE — PHYSICAL EXAM
[Bra Size: _______] : Bra Size: [unfilled] [de-identified] : well-appearing, NAD [de-identified] : Unchanged exam (4/7/25) Right breast: no palpable breast masses, well-healed periareolar scar, nipple discharge or retraction, breast ptosis grade 2 with double bubble deformity; nontender visible and palpable capsular contracture grade 4 and malpositioned into UOQ, implant animation mild with pectoralis contraction, nontender chest wall Left breast: no palpable breast masses, well-healed transverse mastectomy scar with mobile submuscular implant with moderate animation deformity, grade 1 capsular contracture, nontender chest wall. Bilateral breast with no palpable breast masses Bilateral ptotic axillary roll, left > right Bilateral axilla without palpable lymphadenopathy  Bilateral breasts symmetrical and Grade 2 ptosis, volume *** greater than *** breast Left breast: no palpable masses, nipple retraction or discharge. Right breast: no palpable masses, nipple retraction or discharge. Axilla: no clinically palpable lymph adenopathy bilateral Chest: no trunk deformities Palpable LD muscle with moderate minimal soft tissue bulk, bilateral  Breast measurements (standing; (cm)): Regnault ptosis grade: n/a R SN-Nipple: 24 R Nipple-IMF: 9 R Base width: 14 R Nipple - midsternum: 8 R NAC diameter: 6  IMF position asymmetrical, right 2 cm lower than *** breast  L SN-: 20 L Nipple-: 9.5 L Base width: 15 L Nipple - midsternum: L NAC diameter:  [de-identified] : softly protuberant

## 2025-07-16 NOTE — REASON FOR VISIT
[Follow-Up: _____] : a [unfilled] follow-up visit [Friend] : friend [FreeTextEntry1] : DR Morin-[facility]; Dr. Dwyer-Gyn

## 2025-07-16 NOTE — PHYSICAL EXAM
[Bra Size: _______] : Bra Size: [unfilled] [de-identified] : well-appearing, NAD [de-identified] : Unchanged exam (4/7/25) Right breast: no palpable breast masses, well-healed periareolar scar, nipple discharge or retraction, breast ptosis grade 2 with double bubble deformity; nontender visible and palpable capsular contracture grade 4 and malpositioned into UOQ, implant animation mild with pectoralis contraction, nontender chest wall Left breast: no palpable breast masses, well-healed transverse mastectomy scar with mobile submuscular implant with moderate animation deformity, grade 1 capsular contracture, nontender chest wall. Bilateral breast with no palpable breast masses Bilateral ptotic axillary roll, left > right Bilateral axilla without palpable lymphadenopathy  Bilateral breasts symmetrical and Grade 2 ptosis, volume *** greater than *** breast Left breast: no palpable masses, nipple retraction or discharge. Right breast: no palpable masses, nipple retraction or discharge. Axilla: no clinically palpable lymph adenopathy bilateral Chest: no trunk deformities Palpable LD muscle with moderate minimal soft tissue bulk, bilateral  Breast measurements (standing; (cm)): Regnault ptosis grade: n/a R SN-Nipple: 24 R Nipple-IMF: 9 R Base width: 14 R Nipple - midsternum: 8 R NAC diameter: 6  IMF position asymmetrical, right 2 cm lower than *** breast  L SN-: 20 L Nipple-: 9.5 L Base width: 15 L Nipple - midsternum: L NAC diameter:  [de-identified] : softly protuberant

## 2025-07-16 NOTE — ASSESSMENT
[FreeTextEntry1] : 78 yo F with h/o of left breast cancer s/p submuscular silicone implant reconstruction and right breast silicone implant augmentation for symmetry.   s/p right VATS, RUL wedge resection, RLL superior segmentectomy, and MLND (1/29/25) for invasive acinar adenocarcinoma)  Right breast with symptomatic capsular contracture (garde 4) and double bubble deformity.   Left breast with symptomatic intracapsular implant rupture -   Recommend left breast reconstruction revision with totatl capsulectomy, removal of ruptured implantand  replacement with silicone implant (pt desire slight upsize), and right breast mastopexy removal of silicone implant, possible replacement of  silicone implant  Patient undecided about proceeding or foregoing right breast silicone implant re-insertion.  Pt to consider this option, and would like to otherwise proceed with all other recommendations.  -Regarding breast implants, we discussed saline vs silicone breast implants options (benefits, risks and outcomes).   -Regarding implant exchange, the benefits, risks, and outcomes of implant surgery discussed.  I discussed the incision location.  The risks included but are not limited to bleeding, infection, seroma, hematoma, paraesthesia, poor wound healing, implant failure (infection, extrusion, rupture), implant malposition, capsular contracture, asymmetry, small risk of breast-implant associated lymphoma, possible need for additional planned or unplanned surgery including revision and/or autologous tissue reconstruction (e.g. pedicled latissimus dorsi flap, TDAP flap, etc), and dissatisfaction with outcome.   -She was also informed on the off-label use of biologic mesh, its benefits, risks and alternatives. She was given the opportunity to ask questions; and all were answered to her satisfaction at this time. -Regarding capsular contracture, the patient understands the risk of recurrent capsular contracture. The risk of capsular contracture is increased with infection, radiation therapy, smoking, and history of prior implant capsular contracture. -We also discussed the likelihood of additional surgery on the breast over the course of her lifetime. -Reviewed that her neuropathic chest pain may or may no be resolved after breast reconstruction procedures.  She endorses understandin. -Post-operative course discussed including the need for surgical bra use and no heavy lifting -All questions were answered to the patient's apparent satisfaction, and informed consent was obtained -Follow up with CT Surgery, Cardiology, and Pulm for clearance -Will schedule for CORA procedure under [place]  Photos were taken with patient permission

## 2025-07-16 NOTE — HISTORY OF PRESENT ILLNESS
[FreeTextEntry1] : 72 yo F with PMHx of GERD on PPI s/p with history of <1 cm left breast cancer 10/2008, s/p mastectomy 2008 (Carol King-Breast surgeon), and delayed TE of the left breast 2009, followed by left TE to silicone implant exchange and concurrent right breast augmentation for symmetry ([facility] Inman [facility]).  No history of chemo or XRT therapy.  Currently in a 38B bra with pads  Referred by Dr. Morin for CT scan report of "Prosthesis asymmetric in size with partial collapse of the right noted."  CT scans ordered for work up of chest pain and h/o GERD.  The patient did not bring CD of CT scans for review. Reports have breast surgery related pain in both breast since date of mastectomy has continued after reconstruction.  Endorses that the pain is both constant and tolerable; no increase or change in quality of pain.  She is not interested in revision surgery as she is the primary caretaker of her young grandkids; she is only concerned if their is an implant leak.  She denies chest trauma, breast redness, nipple discharge, breast nodules, breast swelling. Pt provided silicone implant card; Left breast 666 cc and right breast 120 cc.  Last mammogram, right breast 18)-negative  She is raising her grandson (3 yr) and granddaughter (7 yrs) because daughter in law ([place] lung cancer,  of metastatic disease).  She is the primary caretaker of her grandkids.  Accompanied by her friend.  Interval hx (1/10/25):  Last seen in [facility]S office 18 who is now here for evaluation of ruptured left implant as seen on PET scan.  Patient states she has a newly diagnosed RLL nodule which she is undergoing biopsy for at the end of January by Dr. Ernst Baker.  During further work up for nodule she underwent a PET scan which revealed intracapsular rupture of left implant.  Patient denies any chest wall trauma or pain.  She does report some discomfort to the right breast and feels like the implant is "high" but does not know how long it has been that way.     Left implant: ALLERGAN Natrelle silicone filled breast implant -666; SN [id number], 666 cc; Right implant: ALLERGAN Natrelle silicone filled breast implant, -180, SN [id number], 180 cc   Interval hx (25): Here after having CT sx with DR. Mitchell.   s/p right VATS, RUL wedge resection, RLL superior segmentectomy, and MLND (25) for invasive acinar adenocarcinoma).  c/o of band like pain around chest, on gabapentin for neuropathy.  On steroid and symbicort per Pulm for persistent cough. Here for implant check.  Denies bilateral breast pain.    MMG 2024 BIRADS 1.  Interval hx (25):  She has not yet seen Dr. Mitchell s/p right VATS, RUL wedge resection, RLL superior segmentectomy, and MLND (25) for invasive acinar adenocarcinoma).  c/o of band like pain around chest, remains gabapentin 300 mg BID for neuropathy.  On ? medicated patches with some relief.   Cough has since resolved.  Had endoscopy & colonoscopy 3 days ago with no concerning findings per pt.  Complaint of right upper breast fullness and hard implant.  Here to discuss next steps for breast reconstruction revision and ruptured implant removal.

## 2025-07-16 NOTE — HISTORY OF PRESENT ILLNESS
[FreeTextEntry1] : 74 yo F with PMHx of GERD on PPI s/p with history of <1 cm left breast cancer 10/2008, s/p mastectomy 2008 (Carol King-Breast surgeon), and delayed TE of the left breast 2009, followed by left TE to silicone implant exchange and concurrent right breast augmentation for symmetry (Maki Inman [facility]).  No history of chemo or XRT therapy.  Currently in a 38B bra with pads  Referred by Dr. Morin for CT scan report of "Prosthesis asymmetric in size with partial collapse of the right noted."  CT scans ordered for work up of chest pain and h/o GERD.  The patient did not bring CD of CT scans for review. Reports have breast surgery related pain in both breast since date of mastectomy has continued after reconstruction.  Endorses that the pain is both constant and tolerable; no increase or change in quality of pain.  She is not interested in revision surgery as she is the primary caretaker of her young grandkids; she is only concerned if their is an implant leak.  She denies chest trauma, breast redness, nipple discharge, breast nodules, breast swelling. Pt provided silicone implant card; Left breast 666 cc and right breast 120 cc.  Last mammogram, right breast 18)-negative  She is raising her grandson (3 yr) and granddaughter (7 yrs) because daughter in law ([place] lung cancer,  of metastatic disease).  She is the primary caretaker of her grandkids.  Accompanied by her friend.  Interval hx (1/10/25):  Last seen in [facility] office 18 who is now here for evaluation of ruptured left implant as seen on PET scan.  Patient states she has a newly diagnosed RLL nodule which she is undergoing biopsy for at the end of January by Dr. Ernst Baker.  During further work up for nodule she underwent a PET scan which revealed intracapsular rupture of left implant.  Patient denies any chest wall trauma or pain.  She does report some discomfort to the right breast and feels like the implant is "high" but does not know how long it has been that way.     Left implant: ALLERGAN Natrelle silicone filled breast implant -666; SN [id number], 666 cc; Right implant: ALLERGAN Natrelle silicone filled breast implant, -180, SN [id number], 180 cc   Interval hx (25): Here after having CT sx with DR. Mitchell.   s/p right VATS, RUL wedge resection, RLL superior segmentectomy, and MLND (25) for invasive acinar adenocarcinoma).  c/o of band like pain around chest, on gabapentin for neuropathy.  On steroid and symbicort per Pulm for persistent cough. Here for implant check.  Denies bilateral breast pain.    MMG 2024 BIRADS 1.  Interval hx (25):  She has not yet seen Dr. Mitchell s/p right VATS, RUL wedge resection, RLL superior segmentectomy, and MLND (25) for invasive acinar adenocarcinoma).  c/o of band like pain around chest, remains gabapentin 300 mg BID for neuropathy.  On ? medicated patches with some relief.   Cough has since resolved.  Had endoscopy & colonoscopy 3 days ago with no concerning findings per pt.  Complaint of right upper breast fullness and hard implant.  Here to discuss next steps for breast reconstruction revision and ruptured implant removal.